# Patient Record
Sex: FEMALE | Race: WHITE | NOT HISPANIC OR LATINO | Employment: FULL TIME | URBAN - METROPOLITAN AREA
[De-identification: names, ages, dates, MRNs, and addresses within clinical notes are randomized per-mention and may not be internally consistent; named-entity substitution may affect disease eponyms.]

---

## 2024-07-30 ENCOUNTER — OFFICE VISIT (OUTPATIENT)
Dept: URGENT CARE | Facility: CLINIC | Age: 24
End: 2024-07-30
Payer: COMMERCIAL

## 2024-07-30 VITALS
BODY MASS INDEX: 24.55 KG/M2 | WEIGHT: 130 LBS | TEMPERATURE: 99 F | DIASTOLIC BLOOD PRESSURE: 73 MMHG | OXYGEN SATURATION: 99 % | SYSTOLIC BLOOD PRESSURE: 128 MMHG | HEIGHT: 61 IN | HEART RATE: 84 BPM | RESPIRATION RATE: 20 BRPM

## 2024-07-30 DIAGNOSIS — U07.1 COVID-19: Primary | ICD-10-CM

## 2024-07-30 DIAGNOSIS — R05.9 COUGH, UNSPECIFIED TYPE: ICD-10-CM

## 2024-07-30 LAB
CTP QC/QA: YES
SARS-COV-2 AG RESP QL IA.RAPID: POSITIVE

## 2024-07-30 NOTE — PATIENT INSTRUCTIONS
The CDC has changed their approach to COVID isolation to be in line with other respiratory viruses.         If you test positive for COVID-19 you may return to normal activities when, for at least 24 hours, both are true:     Your symptoms are getting better overall, and:  You have not had a fever AND are not using fever reducing medication    If you are ill with COVID-19, wear a mask for 10 days, where day 1 is when symptoms started. If you tested positive, but never had symptoms, day 1 is the date of positive test.     The CDC also recommends added precautions in the 5 days after return to normal activity including frequent hand washing, mask wearing, physical distancing.

## 2024-07-30 NOTE — LETTER
[address] [facility] [address]  Phone: [phone]  Fax: [phone]          Return to Work/School    Patient: Ewelina Brown  YOB: 2000   Date: 07/30/2024     To Whom It May Concern:     Ewelina Brown was in contact with/seen in my office on 07/30/2024. COVID-19 is present in our communities across the state. There is limited testing for COVID at this time, so not all patients can be tested. In this situation, your employee meets the following criteria:     Ewelina Brown has met the criteria for COVID-19 testing and has a POSITIVE result. She can return to work once they are asymptomatic for 24 hours without the use of fever reducing medications AND at least five days from the start of symptoms (or from the first positive result if they have no symptoms).      If you have any questions or concerns, or if I can be of further assistance, please do not hesitate to contact me.     Sincerely,    Nancie Dougherty, NP

## 2024-07-30 NOTE — PROGRESS NOTES
"Subjective:      Patient ID: Ewelina Brown is a 24 y.o. female.    Vitals:  height is 5' 1" (1.549 m) and weight is 59 kg (130 lb). Her oral temperature is 99.4 °F (37.4 °C). Her blood pressure is 128/73 and her pulse is 84. Her respiration is 20 and oxygen saturation is 99%.     Chief Complaint: Cough    Pt presents w/ cough, body aches, sinus congestion, and fever. Pt reports she is on day 4 of fever and unable to consistently break it. Pt tested positive for Covid at home. Wants official documentation for work.     Cough  This is a new problem. The current episode started in the past 7 days. The problem has been gradually worsening. The problem occurs constantly. The cough is Productive of sputum. Associated symptoms include chills, ear congestion, ear pain, a fever, headaches, myalgias, nasal congestion, a sore throat, shortness of breath and sweats. Pertinent negatives include no chest pain or wheezing. Treatments tried: dayquil and nyquil, ibuprofen. The treatment provided mild relief. There is no history of asthma.       Constitution: Positive for chills and fever.   HENT:  Positive for ear pain and sore throat.    Cardiovascular:  Negative for chest pain.   Respiratory:  Positive for cough and shortness of breath. Negative for wheezing.    Musculoskeletal:  Positive for muscle ache.   Neurological:  Positive for headaches.      Objective:     Physical Exam   Constitutional: She is oriented to person, place, and time.   HENT:   Head: Normocephalic and atraumatic.   Cardiovascular: Normal rate.   Pulmonary/Chest: Effort normal. No respiratory distress.   Abdominal: Normal appearance.   Neurological: She is alert and oriented to person, place, and time.   Skin: Skin is warm and dry.   Psychiatric: Her behavior is normal. Mood normal.       Results for orders placed or performed in visit on 07/30/24   SARS Coronavirus 2 Antigen, POCT Manual Read   Result Value Ref Range    SARS Coronavirus 2 Antigen Positive (A) " Negative     Acceptable Yes       Assessment:     1. COVID-19    2. Cough, unspecified type        Plan:   You Tested positive for COVID today    Covid risk score    0  The CDC has changed their approach to COVID isolation to be in line with other respiratory viruses.         If you test positive for COVID-19 you may return to normal activities when, for at least 24 hours, both are true:     Your symptoms are getting better overall, and:  You have not had a fever AND are not using fever reducing medication    If you are ill with COVID-19, wear a mask for 10 days, where day 1 is when symptoms started. If you tested positive, but never had symptoms, day 1 is the date of positive test.     The CDC also recommends added precautions in the 5 days after return to normal activity including frequent hand washing, mask wearing, physical distancing.              COVID-19  -     nirmatrelvir-ritonavir 300 mg (150 mg x 2)-100 mg copackaged tablets (EUA); Take 3 tablets by mouth 2 (two) times daily for 5 days. Each dose contains 2 nirmatrelvir (pink tablets) and 1 ritonavir (white tablet). Take all 3 tablets together  Dispense: 30 tablet; Refill: 0    Cough, unspecified type  -     SARS Coronavirus 2 Antigen, POCT Manual Read